# Patient Record
Sex: MALE | Race: WHITE | NOT HISPANIC OR LATINO
[De-identification: names, ages, dates, MRNs, and addresses within clinical notes are randomized per-mention and may not be internally consistent; named-entity substitution may affect disease eponyms.]

---
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
## 2019-02-22 ENCOUNTER — EMERGENCY (EMERGENCY)
Facility: HOSPITAL | Age: 19
LOS: 1 days | Discharge: HOME | End: 2019-02-22
Attending: EMERGENCY MEDICINE | Admitting: EMERGENCY MEDICINE
Payer: COMMERCIAL

## 2019-02-22 VITALS
TEMPERATURE: 98 F | OXYGEN SATURATION: 100 % | DIASTOLIC BLOOD PRESSURE: 63 MMHG | HEART RATE: 60 BPM | RESPIRATION RATE: 16 BRPM | WEIGHT: 149.91 LBS | SYSTOLIC BLOOD PRESSURE: 129 MMHG | HEIGHT: 72 IN

## 2019-02-22 VITALS
DIASTOLIC BLOOD PRESSURE: 66 MMHG | RESPIRATION RATE: 18 BRPM | TEMPERATURE: 97 F | OXYGEN SATURATION: 100 % | HEART RATE: 61 BPM | SYSTOLIC BLOOD PRESSURE: 120 MMHG

## 2019-02-22 DIAGNOSIS — Z98.890 OTHER SPECIFIED POSTPROCEDURAL STATES: Chronic | ICD-10-CM

## 2019-02-22 LAB
ANION GAP SERPL CALC-SCNC: 16 MMO/L — HIGH (ref 7–14)
APPEARANCE UR: CLEAR — SIGNIFICANT CHANGE UP
BILIRUB UR-MCNC: NEGATIVE — SIGNIFICANT CHANGE UP
BLOOD UR QL VISUAL: NEGATIVE — SIGNIFICANT CHANGE UP
BUN SERPL-MCNC: 13 MG/DL — SIGNIFICANT CHANGE UP (ref 7–23)
CALCIUM SERPL-MCNC: 9.4 MG/DL — SIGNIFICANT CHANGE UP (ref 8.4–10.5)
CHLORIDE SERPL-SCNC: 98 MMOL/L — SIGNIFICANT CHANGE UP (ref 98–107)
CO2 SERPL-SCNC: 23 MMOL/L — SIGNIFICANT CHANGE UP (ref 22–31)
COLOR SPEC: YELLOW — SIGNIFICANT CHANGE UP
CREAT SERPL-MCNC: 0.86 MG/DL — SIGNIFICANT CHANGE UP (ref 0.5–1.3)
GLUCOSE SERPL-MCNC: 82 MG/DL — SIGNIFICANT CHANGE UP (ref 70–99)
GLUCOSE UR-MCNC: NEGATIVE — SIGNIFICANT CHANGE UP
HCT VFR BLD CALC: 43.5 % — SIGNIFICANT CHANGE UP (ref 39–50)
HGB BLD-MCNC: 13.3 G/DL — SIGNIFICANT CHANGE UP (ref 13–17)
KETONES UR-MCNC: SIGNIFICANT CHANGE UP
LEUKOCYTE ESTERASE UR-ACNC: NEGATIVE — SIGNIFICANT CHANGE UP
MCHC RBC-ENTMCNC: 22.1 PG — LOW (ref 27–34)
MCHC RBC-ENTMCNC: 30.6 % — LOW (ref 32–36)
MCV RBC AUTO: 72.3 FL — LOW (ref 80–100)
NITRITE UR-MCNC: NEGATIVE — SIGNIFICANT CHANGE UP
NRBC # FLD: 0 K/UL — LOW (ref 25–125)
PH UR: 5.5 — SIGNIFICANT CHANGE UP (ref 5–8)
PLATELET # BLD AUTO: 230 K/UL — SIGNIFICANT CHANGE UP (ref 150–400)
PMV BLD: 10.1 FL — SIGNIFICANT CHANGE UP (ref 7–13)
POTASSIUM SERPL-MCNC: 4.4 MMOL/L — SIGNIFICANT CHANGE UP (ref 3.5–5.3)
POTASSIUM SERPL-SCNC: 4.4 MMOL/L — SIGNIFICANT CHANGE UP (ref 3.5–5.3)
PROT UR-MCNC: 10 — SIGNIFICANT CHANGE UP
RBC # BLD: 6.02 M/UL — HIGH (ref 4.2–5.8)
RBC # FLD: 15.9 % — HIGH (ref 10.3–14.5)
SODIUM SERPL-SCNC: 137 MMOL/L — SIGNIFICANT CHANGE UP (ref 135–145)
SP GR SPEC: 1.03 — SIGNIFICANT CHANGE UP (ref 1–1.04)
UROBILINOGEN FLD QL: NORMAL — SIGNIFICANT CHANGE UP
WBC # BLD: 8.6 K/UL — SIGNIFICANT CHANGE UP (ref 3.8–10.5)
WBC # FLD AUTO: 8.6 K/UL — SIGNIFICANT CHANGE UP (ref 3.8–10.5)

## 2019-02-22 PROCEDURE — 99284 EMERGENCY DEPT VISIT MOD MDM: CPT | Mod: 25

## 2019-02-22 PROCEDURE — 74176 CT ABD & PELVIS W/O CONTRAST: CPT | Mod: 26

## 2019-02-22 NOTE — ED PROVIDER NOTE - CLINICAL SUMMARY MEDICAL DECISION MAKING FREE TEXT BOX
Justice TORRES MD PGY1: 17 M p/w RLQ pain not reproduced with physical exam and urinary urgency c/f appendicitis vs UTI vs stone disease. Will w/u with UA and CTAP. Justice TORRES MD PGY1: 17 M p/w RLQ pain not reproduced with physical exam and urinary urgency. Will w/u with UA and CTAP.

## 2019-02-22 NOTE — ED PROVIDER NOTE - OBJECTIVE STATEMENT
Justice TORRES MD PGY1: 18 M no PMH p/w 1 day intermittent RLQ sharp pain radiating to groin assoc with increased difficulty urination. No trigger for pain. No dysuria, hematuria. Feels bladder is "full" and has urgency but no urine. This is in spite of drinking 1.5 L water to try to encourage urine output. No fevers/ chills. No hx renal disease. Justice TORRES MD PGY1: 18 M no PMH p/w 1 day intermittent rlq abd sharp pain radiating to groin assoc with increased difficulty urination. No trigger for pain. No dysuria, hematuria. Feels bladder is "full" and has urgency but no urine. This is in spite of drinking 1.5 L water to try to encourage urine output. No fevers/ chills. No hx renal disease.

## 2019-02-22 NOTE — ED PROVIDER NOTE - ATTENDING CONTRIBUTION TO CARE
patient presents with rlq abd pain which radiates to groin, constant, was severe and sudden, now mild, associated with urgency and dribbling of urine without ability to fully evacuate bladder. No fevers, chills, ha, nausea, vomiting, cp, sob, diarrhea, hematuria, testicular pain or swelling.  exam  GEN - NAD; well appearing; A+O x3   HEAD - NC/AT   EYES- PERRL, EOMI  ENT: Airway patent, mmm, Oral cavity and pharynx normal. No inflammation, swelling, exudate, or lesions.  NECK: Neck supple, non-tender without lymphadenopathy, no masses.  PULMONARY - CTA b/l, symmetric breath sounds.   CARDIAC -s1s2, RRR, no M,G,R  ABDOMEN - +BS, ND, NT, soft, no guarding, no rebound, no masses   BACK - no CVA tenderness, Normal  spine   EXTREMITIES - FROM, symmetric pulses, capillary refill < 2 seconds, no edema   SKIN - no rash or bruising   NEUROLOGIC - alert, speech clear, no focal deficits  PSYCH -nl mood/affect, nl insight.  a/p-patient presents with sudden onset rlq abd pain, dribbling of urine with sensation of retention, well appearing, nontender abdomen, no testicular symptoms, vss, will check labs, ct a/p, monitor, reass.

## 2019-02-22 NOTE — ED ADULT TRIAGE NOTE - CHIEF COMPLAINT QUOTE
Pt arrives to ED c/o rt sided flank pain with radiation to groin and reduced urinary output.  Pt reports pain comes and goes and is felt more when trying to urinate.  Pt reports the small amount of urine produced is clear.

## 2019-02-22 NOTE — ED PROVIDER NOTE - PHYSICAL EXAMINATION
Justice TORRES MD PGY1:   PHYSICAL EXAM:    GENERAL: NAD, well-developed  HEENT:  Atraumatic, Normocephalic  CHEST/LUNG: Chest rise equal bilaterally  HEART: Regular rate and rhythm  ABDOMEN: Soft, Nontender, Nondistended  EXTREMITIES:  2+ Peripheral Pulses.  PSYCH: A&Ox3  SKIN: No obvious rashes or lesions

## 2019-02-22 NOTE — ED PROVIDER NOTE - NSFOLLOWUPCLINICS_GEN_ALL_ED_FT
[facility] - Primary Care  Primary Care  [address] [address]  Phone: [phone]  Fax:   Follow Up Time:

## 2019-02-22 NOTE — ED PROVIDER NOTE - PROGRESS NOTE DETAILS
patient was able to urinate in ed without difficulty, bedside bladder us with pvr-no detectable urine in bladder, pain resolved, All results d/w patient and copies given with instructions to bring with them to their follow up appointment.  The patient was given verbal and written discharge instructions Specifically, instructions when to return to the ED and to seek follow-up from their pcp within 1-2 days. The patient understands that should their symptoms worsen or any new symptoms arise, they should return to the ED immediately for further evaluation.  Vss, NAD, tolerating po and ambulating steadily at discharge.

## 2019-02-22 NOTE — ED ADULT NURSE NOTE - OBJECTIVE STATEMENT
Patient received in room 8 with complaints of groin pain with decreased urine output/ urinary retention. Patient is A&OX3, ambulatory, respirations are even and unlabored, S1, S2 regular, abdomen is soft and nontender, 20 gauge saline lock placed on left AC, blood drawn and sent. safety measures maintained. Mother is at the bedside. Will follow up.

## 2020-01-08 ENCOUNTER — APPOINTMENT (OUTPATIENT)
Dept: PEDIATRIC UROLOGY | Facility: CLINIC | Age: 20
End: 2020-01-08
Payer: COMMERCIAL

## 2020-01-08 VITALS — TEMPERATURE: 97.9 F | BODY MASS INDEX: 20.99 KG/M2 | HEIGHT: 72 IN | WEIGHT: 155 LBS

## 2020-01-08 PROCEDURE — 76870 US EXAM SCROTUM: CPT

## 2020-01-08 PROCEDURE — 93976 VASCULAR STUDY: CPT

## 2020-01-08 PROCEDURE — 99244 OFF/OP CNSLTJ NEW/EST MOD 40: CPT | Mod: 25

## 2020-01-08 NOTE — DATA REVIEWED
[FreeTextEntry1] : EXAMINATION:  US SCROTUM\par DOS 01/08/2020 \par FINDINGS: LARGE LEFT HYDROCELE AND MODERATE SIZED RIGHT EPIDIDYMAL CYST; OTHERWISE UNREMARKABLE SCROTAL CONTENTS; NORMAL TESTES WITH NORMAL FLOW\par

## 2020-01-08 NOTE — HISTORY OF PRESENT ILLNESS
[TextBox_4] : Santhosh is here for evaluation today.  he underwent a left sided laparoscopic varicocele repair in 2017.  He did well and was noted to have a very small hydrocele in 2018.  Recently, he reports the hydrocele markedly increased and it feels "very heavy:"  No incapacitating pain.  No redness.

## 2020-01-08 NOTE — PHYSICAL EXAM
[Well nourished] : well nourished [Well developed] : well developed [Good dentition] : good dentition [Dysmorphic] : no dysmorphic [Acute Distress] : no acute distress [Abnormal shape or signs of trauma] : no abnormal shape or signs of trauma [Abnormal ear position] : no abnormal ear position [Ear anomaly] : no ear anomaly [Abnormal nose shape] : no abnormal nose shape [Nasal discharge] : no nasal discharge [Mouth lesions] : no mouth lesions [Eye discharge] : no eye discharge [Icteric sclera] : no icteric sclera [Labored breathing] : non- labored breathing [Rigid] : not rigid [Mass] : no mass [Hepatomegaly] : no hepatomegaly [Splenomegaly] : no splenomegaly [Palpable bladder] : no palpable bladder [RUQ Tenderness] : no ruq tenderness [LUQ Tenderness] : no luq tenderness [LLQ Tenderness] : no llq tenderness [RLQ Tenderness] : no rlq tenderness [Right tenderness] : no right tenderness [Renomegaly] : no renomegaly [Left tenderness] : no left tenderness [Right-side mass] : no right-side mass [Left-side mass] : no left-side mass [Dimple] : no dimple [Hair Tuft] : no hair tuft [Edema] : no edema [Limited limb movement] : no limited limb movement [Ulcers] : no ulcers [Rashes] : no rashes [Abnormal turgor] : normal turgor [Circumcised] : circumcised [No] : no curvature [At tip of glans] : meatus at tip of glans [TextBox_182] : Scrotum:  right testis normal t palpation and location.  Left testis not palpable within the tense left hydrocele

## 2020-01-08 NOTE — REASON FOR VISIT
[Initial Consultation] : an initial consultation [Patient] : patient [TextBox_50] : s/p varicocelectomy 2017, hydrocele [TextBox_8] : Dr. Parminder Leal

## 2020-01-08 NOTE — ASSESSMENT
[FreeTextEntry1] : Santhosh has a large and tense left hydrocele following the left varicocele repair several years ago.  He recalled that this was an outcome that was possible when we had discussed it previously.  We discussed the management options of observation versus surgery and their risks and benefits.  I described the surgery and the anticipated postoperative course and possible complications such as bleeding, infection and recurrence among others. Due to the discomfort, he is inclined to have the surgery.    All questions were answered.  He will discuss with his parents before proceeding with surgery. \par

## 2020-01-08 NOTE — CONSULT LETTER
[Dear  ___] : Dear  [unfilled], [Consult Letter:] : I had the pleasure of evaluating your patient, [unfilled]. [FreeTextEntry1] : Please see my note below.\par \par Thank you so very much for allowing to participate in YUDY's care.  Please don't hesitate to call me should any questions or issues arise.\par \par Sincerely, \par \par Tahir\par \par Tahir Bates MD\par Chief, Pediatric Urology\par Professor of Urology and Pediatrics\par [facility] [employer]\par

## 2020-02-07 ENCOUNTER — OUTPATIENT (OUTPATIENT)
Dept: OUTPATIENT SERVICES | Facility: HOSPITAL | Age: 20
LOS: 1 days | End: 2020-02-07
Payer: COMMERCIAL

## 2020-02-07 VITALS
HEIGHT: 72 IN | WEIGHT: 154.98 LBS | OXYGEN SATURATION: 99 % | SYSTOLIC BLOOD PRESSURE: 134 MMHG | DIASTOLIC BLOOD PRESSURE: 74 MMHG | RESPIRATION RATE: 16 BRPM | HEART RATE: 70 BPM | TEMPERATURE: 98 F

## 2020-02-07 DIAGNOSIS — N43.3 HYDROCELE, UNSPECIFIED: ICD-10-CM

## 2020-02-07 DIAGNOSIS — Z98.890 OTHER SPECIFIED POSTPROCEDURAL STATES: Chronic | ICD-10-CM

## 2020-02-07 DIAGNOSIS — Z01.818 ENCOUNTER FOR OTHER PREPROCEDURAL EXAMINATION: ICD-10-CM

## 2020-02-07 LAB
APPEARANCE UR: CLEAR — SIGNIFICANT CHANGE UP
BILIRUB UR-MCNC: NEGATIVE — SIGNIFICANT CHANGE UP
COLOR SPEC: YELLOW — SIGNIFICANT CHANGE UP
DIFF PNL FLD: NEGATIVE — SIGNIFICANT CHANGE UP
GLUCOSE UR QL: NEGATIVE — SIGNIFICANT CHANGE UP
KETONES UR-MCNC: NEGATIVE — SIGNIFICANT CHANGE UP
LEUKOCYTE ESTERASE UR-ACNC: NEGATIVE — SIGNIFICANT CHANGE UP
NITRITE UR-MCNC: NEGATIVE — SIGNIFICANT CHANGE UP
PH UR: 5 — SIGNIFICANT CHANGE UP (ref 5–8)
PROT UR-MCNC: NEGATIVE — SIGNIFICANT CHANGE UP
SP GR SPEC: 1.01 — SIGNIFICANT CHANGE UP (ref 1.01–1.02)
UROBILINOGEN FLD QL: NEGATIVE — SIGNIFICANT CHANGE UP

## 2020-02-07 PROCEDURE — 87086 URINE CULTURE/COLONY COUNT: CPT

## 2020-02-07 PROCEDURE — G0463: CPT

## 2020-02-07 PROCEDURE — 81003 URINALYSIS AUTO W/O SCOPE: CPT

## 2020-02-07 NOTE — H&P PST ADULT - NSICDXPROBLEM_GEN_ALL_CORE_FT
PROBLEM DIAGNOSES  Problem: Hydrocele, unspecified  Assessment and Plan: scheduled for a left hydrocelectomy on 2/11/2020 with Dr. Bates.    Problem: Pre-op evaluation  Assessment and Plan: Labs  - UA and C/S  No MC needed. Pre op and Hibiclens instructions reviewed and given. Instructed to avoid NSAIDs and OTC supplements. Verbalized understanding

## 2020-02-07 NOTE — H&P PST ADULT - HISTORY OF PRESENT ILLNESS
reports mild testicular pain with swelling. H/O left variococelectomy in 2017. Denies urinary symptomology 20 yo healthy male presents to [facility] scheduled for a left hydrocelectomy on 2/11/2020 with Dr. Bates. Reports mild left testicular pain with swelling. H/O left variococelectomy in 2017. Denies urinary symptomology

## 2020-02-08 LAB
CULTURE RESULTS: NO GROWTH — SIGNIFICANT CHANGE UP
SPECIMEN SOURCE: SIGNIFICANT CHANGE UP

## 2020-02-10 ENCOUNTER — TRANSCRIPTION ENCOUNTER (OUTPATIENT)
Age: 20
End: 2020-02-10

## 2020-02-11 ENCOUNTER — OUTPATIENT (OUTPATIENT)
Dept: OUTPATIENT SERVICES | Facility: HOSPITAL | Age: 20
LOS: 1 days | End: 2020-02-11
Payer: COMMERCIAL

## 2020-02-11 ENCOUNTER — APPOINTMENT (OUTPATIENT)
Dept: PEDIATRIC UROLOGY | Facility: HOSPITAL | Age: 20
End: 2020-02-11

## 2020-02-11 VITALS
RESPIRATION RATE: 17 BRPM | OXYGEN SATURATION: 100 % | HEIGHT: 72 IN | HEART RATE: 73 BPM | DIASTOLIC BLOOD PRESSURE: 50 MMHG | SYSTOLIC BLOOD PRESSURE: 131 MMHG | TEMPERATURE: 98 F | WEIGHT: 154.98 LBS

## 2020-02-11 VITALS
HEART RATE: 55 BPM | DIASTOLIC BLOOD PRESSURE: 53 MMHG | OXYGEN SATURATION: 100 % | SYSTOLIC BLOOD PRESSURE: 116 MMHG | RESPIRATION RATE: 14 BRPM

## 2020-02-11 DIAGNOSIS — Z98.890 OTHER SPECIFIED POSTPROCEDURAL STATES: Chronic | ICD-10-CM

## 2020-02-11 DIAGNOSIS — N43.3 HYDROCELE, UNSPECIFIED: ICD-10-CM

## 2020-02-11 PROCEDURE — 55040 REMOVAL OF HYDROCELE: CPT

## 2020-02-11 PROCEDURE — 54830 REMOVE EPIDIDYMIS LESION: CPT

## 2020-02-11 PROCEDURE — 55060 REPAIR OF HYDROCELE: CPT | Mod: LT

## 2020-02-11 RX ORDER — OXYCODONE HYDROCHLORIDE 5 MG/1
5 TABLET ORAL ONCE
Refills: 0 | Status: DISCONTINUED | OUTPATIENT
Start: 2020-02-11 | End: 2020-02-12

## 2020-02-11 RX ORDER — ONDANSETRON 8 MG/1
4 TABLET, FILM COATED ORAL ONCE
Refills: 0 | Status: DISCONTINUED | OUTPATIENT
Start: 2020-02-11 | End: 2020-02-12

## 2020-02-11 RX ORDER — SODIUM CHLORIDE 9 MG/ML
1000 INJECTION, SOLUTION INTRAVENOUS
Refills: 0 | Status: DISCONTINUED | OUTPATIENT
Start: 2020-02-11 | End: 2020-03-03

## 2020-02-11 RX ORDER — FENTANYL CITRATE 50 UG/ML
25 INJECTION INTRAVENOUS
Refills: 0 | Status: DISCONTINUED | OUTPATIENT
Start: 2020-02-11 | End: 2020-02-12

## 2020-02-11 RX ADMIN — SODIUM CHLORIDE 100 MILLILITER(S): 9 INJECTION, SOLUTION INTRAVENOUS at 12:21

## 2020-02-11 NOTE — NOTES
[FreeTextEntry1] : Left hydrocele [FreeTextEntry2] : same [FreeTextEntry4] : Scrotal incision\par Excision of hydrocele sac [FreeTextEntry3] : Left hydrocelectomy [FreeTextEntry5] : none [FreeTextEntry6] : Scrotal support\par Hot compresses TID x 2 weeks\par No sports until return visit\par FU 2 weeks\par

## 2020-02-11 NOTE — ASU DISCHARGE PLAN (ADULT/PEDIATRIC) - CARE PROVIDER_API CALL
Tahir Bates)  Pediatric Urology; Urology  [address] [place] [address], [facility] [place] [address]  Phone: [phone]  Fax: [phone]  Established Patient  Follow Up Time: 2 weeks

## 2020-02-11 NOTE — ASU DISCHARGE PLAN (ADULT/PEDIATRIC) - COMMENTS
No gym, sports, heavy lifting, or strenuous/vigorous activity for 4 weeks.  Wear supportive underwear.  Can shower the third day after surgery.

## 2020-02-12 PROBLEM — N43.3 HYDROCELE, UNSPECIFIED: Chronic | Status: ACTIVE | Noted: 2020-02-07

## 2020-02-26 ENCOUNTER — APPOINTMENT (OUTPATIENT)
Dept: PEDIATRIC UROLOGY | Facility: CLINIC | Age: 20
End: 2020-02-26
Payer: COMMERCIAL

## 2020-02-26 VITALS — TEMPERATURE: 98.7 F | WEIGHT: 155 LBS | BODY MASS INDEX: 20.99 KG/M2 | HEIGHT: 72 IN

## 2020-02-26 PROCEDURE — 99024 POSTOP FOLLOW-UP VISIT: CPT

## 2020-02-28 NOTE — CONSULT LETTER
[Dear  ___] : Dear  [unfilled], [Courtesy Letter:] : I had the pleasure of seeing your patient, [unfilled], in my office today. [FreeTextEntry1] : Please see my note below.\par \par Thank you so very much for allowing to participate in YUDY's care.  Please don't hesitate to call me should any questions or issues arise.\par \par Sincerely, \par \par Tahir\par \par Tahir Bates MD\par Chief, Pediatric Urology\par Professor of Urology and Pediatrics\par [facility] [employer]\par

## 2020-02-28 NOTE — ASSESSMENT
[FreeTextEntry1] : Santhosh is doing very well after the left hydrocelectomy.  I reassured him that all is well and that complete resolution can take m,any many months or perhaps the left side may always feel different than the right side.  he will resume all activities.  He will return in 4 months.   All questions were answered.

## 2020-02-28 NOTE — HISTORY OF PRESENT ILLNESS
[TextBox_4] : Santhosh is here postoperatively following hydrocelectomy on 2/11.  He has been doing well since the operation.  There has been minimal discomfort.  No issues with the incision. Mild edema and no discharge or redness.  Appetite is back to normal.\par

## 2020-06-01 ENCOUNTER — APPOINTMENT (OUTPATIENT)
Dept: PEDIATRIC UROLOGY | Facility: CLINIC | Age: 20
End: 2020-06-01
Payer: COMMERCIAL

## 2020-06-01 VITALS — HEIGHT: 72 IN | WEIGHT: 157 LBS | BODY MASS INDEX: 21.26 KG/M2

## 2020-06-01 DIAGNOSIS — Z78.9 OTHER SPECIFIED HEALTH STATUS: ICD-10-CM

## 2020-06-01 DIAGNOSIS — N48.89 OTHER SPECIFIED DISORDERS OF PENIS: ICD-10-CM

## 2020-06-01 DIAGNOSIS — N43.3 HYDROCELE, UNSPECIFIED: ICD-10-CM

## 2020-06-01 PROCEDURE — 99213 OFFICE O/P EST LOW 20 MIN: CPT | Mod: 95

## 2020-06-01 NOTE — HISTORY OF PRESENT ILLNESS
[Home] : at home, [unfilled] , at the time of the visit. [Other Location: e.g. Home (Enter Location, City,State)___] : at [unfilled] [Verbal consent obtained from patient] : the patient, [unfilled] [TextBox_4] : I verified the identity of the patient and the reason for the appointment with the parent.  I explained  to the parent that telemedicine encounters are not the same as a direct patient/healthcare provider visit because the patient and healthcare provider are not in the same room, which can result in limitations, including with the physical examination.  I explained that the telemedicine encounter may require the patient’s genitalia to be shown.  I explained that after the telemedicine encounter, the patient may require an office visit for an in-person physical examination, ultrasound or other testing.  I informed the parent that there may be privacy risks associated with the use of the technology and that there may be costs associated with the encounter. I offered the option of an office visit rather than a telemedicine encounter.   Parent stated that all explanations were understood, and that all questions were answered to their satisfaction.  The parent verbalized their preference and consent to proceed with the telemedicine encounter.\par \gianna YUDY is here for an evaluation. He is status post a left hydrocelectomy on 2/11/20 with no complications. He comes in today with complaints of point tenderness to the underside of his penis since March 2020 after he felt a "pop" while masturbating. He is still able to ejaculate, but has abstained from sexual intercourse since the incident. No bruising noted to area and only tender to touch. No change in voiding. Able to get painless erections.  Mildly tender to the touch and better than immediately \par

## 2020-06-01 NOTE — CONSULT LETTER
[Dear  ___] : Dear  [unfilled], [Courtesy Letter:] : I had the pleasure of seeing your patient, [unfilled], in my office today. [FreeTextEntry1] : Please see my note below.\par \par Thank you so very much for allowing to participate in YUDY's care.  Please don't hesitate to call me should any questions or issues arise.\par \par Sincerely, \par \par Tahir\par \par Tahir Bates MD\par Chief, Pediatric Urology\par Professor of Urology and Pediatrics\par [facility] [employer]\par

## 2020-06-01 NOTE — ASSESSMENT
[FreeTextEntry1] : \gianna Trevizo experienced a "pop" during sexual activity.  There was no bruising or swelling and he has had n issues with erections and feels no palpable difference anywhere on the penis.  he has not restarted any sexual activity and there is mild discomfort which is better than immediately after the event.  I recommended waiting at east another month or so to allow more healing and if he has any issues sexually or feels something palpably different, he should reach out again. All questions were answered.

## 2020-06-01 NOTE — REASON FOR VISIT
[Follow-Up Visit] : a follow-up visit [Patient] : patient [TextBox_50] : point tenderness to underside of penis

## 2022-08-08 NOTE — ASU PREOPERATIVE ASSESSMENT, PEDIATRIC(IPARK ONLY) - PRO INTERPRETER NEED 2
English Plan: States history of seasonal allergies\\nRecheck in 2 months Render In Strict Bullet Format?: No Detail Level: Zone Initiate Treatment: Fluticasone cream - apply to affected area of thigh and arms once daily x 2 weeks. Stop for 2 weeks before resuming Discontinue Regimen: Nystatin from urgent care

## 2023-12-14 ENCOUNTER — NON-APPOINTMENT (OUTPATIENT)
Age: 23
End: 2023-12-14
